# Patient Record
Sex: FEMALE | Race: WHITE | NOT HISPANIC OR LATINO | Employment: FULL TIME | URBAN - METROPOLITAN AREA
[De-identification: names, ages, dates, MRNs, and addresses within clinical notes are randomized per-mention and may not be internally consistent; named-entity substitution may affect disease eponyms.]

---

## 2019-08-21 PROBLEM — Z34.90 PREGNANCY: Status: ACTIVE | Noted: 2019-08-21

## 2019-08-21 PROBLEM — Z82.79 FAMILY HISTORY OF SPINA BIFIDA: Status: ACTIVE | Noted: 2019-08-21

## 2019-08-25 PROBLEM — Z28.39 RUBELLA NON-IMMUNE STATUS, ANTEPARTUM: Status: ACTIVE | Noted: 2019-08-25

## 2019-08-25 PROBLEM — O09.899 RUBELLA NON-IMMUNE STATUS, ANTEPARTUM: Status: ACTIVE | Noted: 2019-08-25

## 2020-05-17 PROBLEM — Z28.39 RUBELLA NON-IMMUNE STATUS, ANTEPARTUM: Status: RESOLVED | Noted: 2019-08-25 | Resolved: 2020-05-17

## 2020-05-17 PROBLEM — Z34.90 PREGNANCY: Status: RESOLVED | Noted: 2019-08-21 | Resolved: 2020-05-17

## 2023-05-24 ENCOUNTER — OFFICE VISIT (OUTPATIENT)
Dept: OBSTETRICS AND GYNECOLOGY | Age: 28
End: 2023-05-24
Payer: COMMERCIAL

## 2023-05-24 VITALS
DIASTOLIC BLOOD PRESSURE: 80 MMHG | SYSTOLIC BLOOD PRESSURE: 124 MMHG | HEIGHT: 68 IN | WEIGHT: 223.2 LBS | BODY MASS INDEX: 33.83 KG/M2

## 2023-05-24 DIAGNOSIS — Z01.419 ENCOUNTER FOR GYNECOLOGICAL EXAMINATION: Primary | ICD-10-CM

## 2023-05-24 DIAGNOSIS — Z80.3 FAMILY HISTORY OF BREAST CANCER: ICD-10-CM

## 2023-05-24 DIAGNOSIS — R87.612 LGSIL ON PAP SMEAR OF CERVIX: ICD-10-CM

## 2023-05-24 NOTE — PROGRESS NOTES
Subjective       History of Present Illness    Chief Complaint   Patient presents with   • Gynecologic Exam     annual. last pap 20 (neg), mirena 20. Pt has no complaints.        Cassandra Britton is a 27 y.o. female who presents for annual exam.  Patient of   No problems  Has IUD and is happy with it  Mother diagnosed with breast cancer this year- had surgery and is doing well.  It was stage 1- also was BRCA negative  No menses with IUD    OB History    Para Term  AB Living   2 2 2     2   SAB IAB Ectopic Molar Multiple Live Births           0 2      # Outcome Date GA Lbr Berny/2nd Weight Sex Delivery Anes PTL Lv   2 Term 20 39w3d 01:48 / 00:16 4067 g (8 lb 15.5 oz) M Vag-Spont EPI N OSCAR      Birth Comments: scale 4   1 Term 10/13/16 40w0d 02:07 / 02:31 3118 g (6 lb 14 oz) F Vag-Spont EPI N OSCAR       The following portions of the patient's history were reviewed and updated as appropriate: allergies, current medications, past family history, past medical history, past social history, past surgical history and problem list.        Current contraception: IUD  History of abnormal Pap smear: yes - LGSIL  Perform regular self breast exam: yes - monthly  Family history of uterine or ovarian cancer: no  Family History of colon cancer: no  Family history of breast cancer: yes - MGM and mother diagnosed this year- mother is BRCA negative    Mammogram: not indicated.  Colonoscopy: up to date.  DEXA: done today.  Last Pap:      Social History    Tobacco Use      Smoking status: Never      Smokeless tobacco: Never    Exercise: moderately active  Calcium/Vitamin D: uses supplements    The following portions of the patient's history were reviewed and updated as appropriate: allergies, current medications, past family history, past medical history, past social history, past surgical history and problem list.    Review of Systems   Constitutional: Negative.    HENT: Negative.    Eyes: Negative.  "   Respiratory: Negative.    Cardiovascular: Negative.    Gastrointestinal: Negative.    Endocrine: Negative.    Genitourinary: Negative.    Musculoskeletal: Negative.    Skin: Negative.    Allergic/Immunologic: Negative.    Neurological: Negative.    Hematological: Negative.    Psychiatric/Behavioral: Negative.          Objective   Physical Exam  Vitals reviewed.   Constitutional:       Appearance: She is well-developed.   Neck:      Thyroid: No thyroid mass.   Cardiovascular:      Rate and Rhythm: Normal rate and regular rhythm.      Heart sounds: Normal heart sounds.   Pulmonary:      Effort: Pulmonary effort is normal.      Breath sounds: Normal breath sounds.   Chest:   Breasts:     Right: No mass, nipple discharge, skin change or tenderness.      Left: No mass, nipple discharge, skin change or tenderness.   Abdominal:      Palpations: Abdomen is soft.      Tenderness: There is no abdominal tenderness.   Genitourinary:     Labia:         Right: No rash or lesion.         Left: No rash or lesion.       Vagina: Normal.      Cervix: No cervical motion tenderness, discharge or friability.      Adnexa:         Right: No mass or tenderness.          Left: No mass or tenderness.        Comments: IUD string present  Neurological:      Mental Status: She is alert and oriented to person, place, and time.   Psychiatric:         Behavior: Behavior normal.         /80   Ht 172.7 cm (68\")   Wt 101 kg (223 lb 3.2 oz)   BMI 33.94 kg/m²     Assessment & Plan   Diagnoses and all orders for this visit:    1. Encounter for gynecological examination (Primary)  -     Ambulatory Referral to Breast Surgery  -     IGP,rfx Aptima HPV All Pth    2. Family history of breast cancer    3. LGSIL on Pap smear of cervix          Breast self exam technique reviewed and patient encouraged to perform self-exam monthly.  Discussed healthy lifestyle modifications.  Pap smear done with reflex hpv  Recommended 30 minutes of aerobic exercise " five times per week.  Discussed calcium needs to prevent osteoporosis  Discussed family history of breast cancer- offered referral to high risk breast clinic and patient would like to proceed

## 2023-05-31 LAB
CONV .: NORMAL
CYTOLOGIST CVX/VAG CYTO: NORMAL
CYTOLOGY CVX/VAG DOC CYTO: NORMAL
CYTOLOGY CVX/VAG DOC THIN PREP: NORMAL
DX ICD CODE: NORMAL
HIV 1 & 2 AB SER-IMP: NORMAL
Lab: NORMAL
OTHER STN SPEC: NORMAL
STAT OF ADQ CVX/VAG CYTO-IMP: NORMAL

## 2025-06-16 ENCOUNTER — OFFICE VISIT (OUTPATIENT)
Dept: OBSTETRICS AND GYNECOLOGY | Age: 30
End: 2025-06-16
Payer: COMMERCIAL

## 2025-06-16 VITALS
DIASTOLIC BLOOD PRESSURE: 68 MMHG | SYSTOLIC BLOOD PRESSURE: 116 MMHG | HEIGHT: 68 IN | WEIGHT: 199.6 LBS | BODY MASS INDEX: 30.25 KG/M2

## 2025-06-16 DIAGNOSIS — Z01.419 ENCOUNTER FOR GYNECOLOGICAL EXAMINATION: Primary | ICD-10-CM

## 2025-06-16 NOTE — PROGRESS NOTES
Subjective       History of Present Illness    Chief Complaint   Patient presents with    Gynecologic Exam     Ae last pap (-) cC: pt state she will need to replace her iud its   no complaints today        Cassandra Britton is a 29 y.o. female who presents for annual exam.  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[employer], it has been a really good move for her  She has a primary care doctor that she sees every once in a while, they prescribed her antidepressants which she is doing well on  No bowel or bladder problems  No new partners  Her mother had breast cancer last year but is doing well          OB History    Para Term  AB Living   2 2 2   2   SAB IAB Ectopic Molar Multiple Live Births       0 2      # Outcome Date GA Lbr Berny/2nd Weight Sex Type Anes PTL Lv   2 Term 20 39w3d 01:48 / 00:16 4067 g (8 lb 15.5 oz) M Vag-Spont EPI N OSCAR      Birth Comments: scale 4   1 Term 10/13/16 40w0d 02:07 / 02:31 3118 g (6 lb 14 oz) F Vag-Spont EPI N OSCAR       The following portions of the patient's history were reviewed and updated as appropriate: allergies, current medications, past family history, past medical history, past social history, past surgical history and problem list.    Current contraception: IUD  History of abnormal Pap smear: yes - LGSIL  Perform regular self breast exam: yes - monthly  Family history of uterine or ovarian cancer: no  Family History of colon cancer: no  Family history of breast cancer: yes - MGM and mother diagnosed this year- mother is BRCA negative     Mammogram: not indicated.  Colonoscopy: up to date.  DEXA: done today.  Last Pap:    Social History    Tobacco Use      Smoking status: Never      Smokeless tobacco: Never    Past Medical History:   Diagnosis Date    Anxiety     Depression     Currently taking Zoloft    Vaginal delivery     Varicella      "childhood      Past Surgical History:   Procedure Laterality Date    WISDOM TOOTH EXTRACTION          The following portions of the patient's history were reviewed and updated as appropriate: allergies, current medications, past family history, past medical history, past social history, past surgical history, and problem list.    Review of Systems   Constitutional: Negative.    HENT: Negative.     Eyes: Negative.    Respiratory: Negative.     Cardiovascular: Negative.    Gastrointestinal: Negative.    Endocrine: Negative.    Genitourinary: Negative.    Musculoskeletal: Negative.    Skin: Negative.    Allergic/Immunologic: Negative.    Neurological: Negative.    Hematological: Negative.    Psychiatric/Behavioral: Negative.           Objective   Physical Exam  Vitals reviewed.   Constitutional:       Appearance: She is well-developed.   Neck:      Thyroid: No thyroid mass.   Cardiovascular:      Rate and Rhythm: Normal rate and regular rhythm.      Heart sounds: Normal heart sounds.   Pulmonary:      Effort: Pulmonary effort is normal.      Breath sounds: Normal breath sounds.   Chest:   Breasts:     Right: No mass, nipple discharge, skin change or tenderness.      Left: No mass, nipple discharge, skin change or tenderness.   Abdominal:      Palpations: Abdomen is soft.      Tenderness: There is no abdominal tenderness.   Genitourinary:     Labia:         Right: No rash or lesion.         Left: No rash or lesion.       Vagina: Normal.      Cervix: No cervical motion tenderness, discharge or friability.      Adnexa:         Right: No mass or tenderness.          Left: No mass or tenderness.     Neurological:      Mental Status: She is alert and oriented to person, place, and time.   Psychiatric:         Behavior: Behavior normal.         /68   Ht 172.7 cm (67.99\")   Wt 90.5 kg (199 lb 9.6 oz)   BMI 30.36 kg/m²     Assessment & Plan   Diagnoses and all orders for this visit:    1. Encounter for gynecological " examination (Primary)  -     IGP, Apt HPV,rfx 16 / 18,45          Breast self exam technique reviewed and patient encouraged to perform self-exam monthly.  Discussed healthy lifestyle modifications.  Pap smear done with HPV  Recommended 30 minutes of aerobic exercise five times per week.  Discussed calcium needs to prevent osteoporosis

## 2025-06-19 LAB
CYTOLOGIST CVX/VAG CYTO: ABNORMAL
CYTOLOGY CVX/VAG DOC CYTO: ABNORMAL
CYTOLOGY CVX/VAG DOC THIN PREP: ABNORMAL
DX ICD CODE: ABNORMAL
DX ICD CODE: ABNORMAL
HPV I/H RISK 4 DNA CVX QL PROBE+SIG AMP: NEGATIVE
OTHER STN SPEC: ABNORMAL
PATHOLOGIST CVX/VAG CYTO: ABNORMAL
RECOM F/U CVX/VAG CYTO: ABNORMAL
SERVICE CMNT-IMP: ABNORMAL
STAT OF ADQ CVX/VAG CYTO-IMP: ABNORMAL